# Patient Record
Sex: FEMALE | Race: WHITE | Employment: UNEMPLOYED | URBAN - METROPOLITAN AREA
[De-identification: names, ages, dates, MRNs, and addresses within clinical notes are randomized per-mention and may not be internally consistent; named-entity substitution may affect disease eponyms.]

---

## 2017-08-24 ENCOUNTER — TELEPHONE (OUTPATIENT)
Dept: OBGYN | Facility: CLINIC | Age: 32
End: 2017-08-24

## 2017-08-24 NOTE — TELEPHONE ENCOUNTER
Patient called would like to establish prenatal care .  Patient LMP is 17  Patient is   Patient complains of breast tenderness.

## 2017-08-31 ENCOUNTER — TELEPHONE (OUTPATIENT)
Dept: OBGYN | Facility: CLINIC | Age: 32
End: 2017-08-31

## 2017-08-31 DIAGNOSIS — O20.9 BLEEDING IN EARLY PREGNANCY: Primary | ICD-10-CM

## 2017-08-31 DIAGNOSIS — O20.9 BLEEDING IN EARLY PREGNANCY: ICD-10-CM

## 2017-08-31 LAB — B-HCG SERPL-ACNC: ABNORMAL IU/L (ref 0–5)

## 2017-08-31 PROCEDURE — 86900 BLOOD TYPING SEROLOGIC ABO: CPT | Performed by: ADVANCED PRACTICE MIDWIFE

## 2017-08-31 PROCEDURE — 36415 COLL VENOUS BLD VENIPUNCTURE: CPT | Performed by: ADVANCED PRACTICE MIDWIFE

## 2017-08-31 PROCEDURE — 86901 BLOOD TYPING SEROLOGIC RH(D): CPT | Performed by: ADVANCED PRACTICE MIDWIFE

## 2017-08-31 PROCEDURE — 86850 RBC ANTIBODY SCREEN: CPT | Performed by: ADVANCED PRACTICE MIDWIFE

## 2017-08-31 PROCEDURE — 84702 CHORIONIC GONADOTROPIN TEST: CPT | Performed by: ADVANCED PRACTICE MIDWIFE

## 2017-09-01 ENCOUNTER — TELEPHONE (OUTPATIENT)
Dept: OBGYN | Facility: CLINIC | Age: 32
End: 2017-09-01

## 2017-09-01 ENCOUNTER — HOSPITAL ENCOUNTER (OUTPATIENT)
Dept: ULTRASOUND IMAGING | Facility: CLINIC | Age: 32
Discharge: HOME | End: 2017-09-01
Attending: OBSTETRICS & GYNECOLOGY | Admitting: OBSTETRICS & GYNECOLOGY

## 2017-09-01 DIAGNOSIS — O20.9 BLEEDING IN EARLY PREGNANCY: Primary | ICD-10-CM

## 2017-09-01 DIAGNOSIS — O20.9 BLEEDING IN EARLY PREGNANCY: ICD-10-CM

## 2017-09-01 LAB
ABO + RH BLD: NORMAL
ABO + RH BLD: NORMAL
BLD GP AB SCN SERPL QL: NORMAL
BLOOD BANK CMNT PATIENT-IMP: NORMAL
SPECIMEN EXP DATE BLD: NORMAL

## 2017-09-01 PROCEDURE — 76801 OB US < 14 WKS SINGLE FETUS: CPT

## 2017-09-01 NOTE — TELEPHONE ENCOUNTER
Spoke with patient about US results. Come back for US repeat and intake. Normal us with Subchorionic hemorrhage. Went over bleeding precautions again. Patient agreeable to plan.

## 2017-09-01 NOTE — PROGRESS NOTES
TC to patient to review results. Given pregnancy was result of IUD failure we discussed termination options: medical and surgical.   She says she will think about it.     Gianna Schneider

## 2017-09-01 NOTE — TELEPHONE ENCOUNTER
Spoke with Yazmin and provided her with instructions on how to get to adult imaging for her ultrasound, she is coming from the [employer] where she works. I further explained her HCG results to her and advised we will contact her with US results. She understood plan and had no further questions.

## 2017-09-01 NOTE — TELEPHONE ENCOUNTER
Spoke with Yazmin who is newly pregnant. She states that she realized she was pregnant when she had a Paraguard IUD in. She states that the IUD fell out in the toilet. [facility] and pregnancy was confirmed by UPT. She has upcoming intake/US on 9/18 here at [facility]. She calls last night stating that she has had bright red spotting when she wipes for the past couple days. Denies heavy bleeding. States that she hasn't really even had to use a pad. States she has a little cramping but nothing severe. Nurse ordered betas and ABO per protocol.     Results today show bhcg >18,000. Spoke with Dr. Schneider in clinic. She ordered US.  No availability in clinic. Ordered for radiology and scheduled patient for this afternoon per her request. Left message for patient about US appt. And that we would be in contact about results.

## 2017-09-18 ENCOUNTER — OFFICE VISIT (OUTPATIENT)
Dept: OBGYN | Facility: CLINIC | Age: 32
End: 2017-09-18
Attending: MIDWIFE

## 2017-09-18 VITALS
BODY MASS INDEX: 33.87 KG/M2 | SYSTOLIC BLOOD PRESSURE: 117 MMHG | WEIGHT: 168 LBS | HEART RATE: 88 BPM | DIASTOLIC BLOOD PRESSURE: 80 MMHG | HEIGHT: 59 IN

## 2017-09-18 DIAGNOSIS — O09.41 SUPERVISION OF HIGH-RISK PREGNANCY WITH GRAND MULTIPARITY, FIRST TRIMESTER: Primary | ICD-10-CM

## 2017-09-18 DIAGNOSIS — O99.891 H/O POSTPARTUM DEPRESSION, CURRENTLY PREGNANT: ICD-10-CM

## 2017-09-18 DIAGNOSIS — Z86.59 H/O POSTPARTUM DEPRESSION, CURRENTLY PREGNANT: ICD-10-CM

## 2017-09-18 DIAGNOSIS — Z36.82 ENCOUNTER FOR NUCHAL TRANSLUCENCY TESTING: ICD-10-CM

## 2017-09-18 DIAGNOSIS — G43.009 MIGRAINE WITHOUT AURA AND WITHOUT STATUS MIGRAINOSUS, NOT INTRACTABLE: ICD-10-CM

## 2017-09-18 DIAGNOSIS — Z36.89 ENCOUNTER FOR FETAL ANATOMIC SURVEY: ICD-10-CM

## 2017-09-18 DIAGNOSIS — O21.9 NAUSEA AND VOMITING DURING PREGNANCY PRIOR TO 22 WEEKS GESTATION: ICD-10-CM

## 2017-09-18 DIAGNOSIS — Z34.81 ENCOUNTER FOR SUPERVISION OF OTHER NORMAL PREGNANCY IN FIRST TRIMESTER: Primary | ICD-10-CM

## 2017-09-18 DIAGNOSIS — Z86.19 HISTORY OF COLD SORES: ICD-10-CM

## 2017-09-18 LAB
ABO + RH BLD: NORMAL
ABO + RH BLD: NORMAL
BASOPHILS # BLD AUTO: 0 10E9/L (ref 0–0.2)
BASOPHILS NFR BLD AUTO: 0.1 %
BLD GP AB SCN SERPL QL: NORMAL
BLOOD BANK CMNT PATIENT-IMP: NORMAL
DIFFERENTIAL METHOD BLD: ABNORMAL
EOSINOPHIL # BLD AUTO: 0.1 10E9/L (ref 0–0.7)
EOSINOPHIL NFR BLD AUTO: 0.5 %
ERYTHROCYTE [DISTWIDTH] IN BLOOD BY AUTOMATED COUNT: 12.3 % (ref 10–15)
HCT VFR BLD AUTO: 38.9 % (ref 35–47)
HGB BLD-MCNC: 13 G/DL (ref 11.7–15.7)
IMM GRANULOCYTES # BLD: 0 10E9/L (ref 0–0.4)
IMM GRANULOCYTES NFR BLD: 0.3 %
LYMPHOCYTES # BLD AUTO: 2.3 10E9/L (ref 0.8–5.3)
LYMPHOCYTES NFR BLD AUTO: 16.6 %
MCH RBC QN AUTO: 30.4 PG (ref 26.5–33)
MCHC RBC AUTO-ENTMCNC: 33.4 G/DL (ref 31.5–36.5)
MCV RBC AUTO: 91 FL (ref 78–100)
MONOCYTES # BLD AUTO: 0.8 10E9/L (ref 0–1.3)
MONOCYTES NFR BLD AUTO: 5.6 %
NEUTROPHILS # BLD AUTO: 10.6 10E9/L (ref 1.6–8.3)
NEUTROPHILS NFR BLD AUTO: 76.9 %
NRBC # BLD AUTO: 0 10*3/UL
NRBC BLD AUTO-RTO: 0 /100
PLATELET # BLD AUTO: 420 10E9/L (ref 150–450)
RBC # BLD AUTO: 4.27 10E12/L (ref 3.8–5.2)
SPECIMEN EXP DATE BLD: NORMAL
WBC # BLD AUTO: 13.8 10E9/L (ref 4–11)

## 2017-09-18 PROCEDURE — 86644 CMV ANTIBODY: CPT | Performed by: ADVANCED PRACTICE MIDWIFE

## 2017-09-18 PROCEDURE — 82306 VITAMIN D 25 HYDROXY: CPT | Performed by: ADVANCED PRACTICE MIDWIFE

## 2017-09-18 PROCEDURE — 76817 TRANSVAGINAL US OBSTETRIC: CPT | Mod: ZF

## 2017-09-18 PROCEDURE — 86900 BLOOD TYPING SEROLOGIC ABO: CPT | Performed by: ADVANCED PRACTICE MIDWIFE

## 2017-09-18 PROCEDURE — 86706 HEP B SURFACE ANTIBODY: CPT | Performed by: ADVANCED PRACTICE MIDWIFE

## 2017-09-18 PROCEDURE — 86901 BLOOD TYPING SEROLOGIC RH(D): CPT | Performed by: ADVANCED PRACTICE MIDWIFE

## 2017-09-18 PROCEDURE — 86747 PARVOVIRUS ANTIBODY: CPT | Performed by: ADVANCED PRACTICE MIDWIFE

## 2017-09-18 PROCEDURE — 87086 URINE CULTURE/COLONY COUNT: CPT | Performed by: ADVANCED PRACTICE MIDWIFE

## 2017-09-18 PROCEDURE — 85025 COMPLETE CBC W/AUTO DIFF WBC: CPT | Performed by: ADVANCED PRACTICE MIDWIFE

## 2017-09-18 PROCEDURE — 86780 TREPONEMA PALLIDUM: CPT | Performed by: ADVANCED PRACTICE MIDWIFE

## 2017-09-18 PROCEDURE — 87389 HIV-1 AG W/HIV-1&-2 AB AG IA: CPT | Performed by: ADVANCED PRACTICE MIDWIFE

## 2017-09-18 PROCEDURE — 99212 OFFICE O/P EST SF 10 MIN: CPT | Mod: 25,ZF

## 2017-09-18 PROCEDURE — 86645 CMV ANTIBODY IGM: CPT | Performed by: ADVANCED PRACTICE MIDWIFE

## 2017-09-18 PROCEDURE — 86762 RUBELLA ANTIBODY: CPT | Performed by: ADVANCED PRACTICE MIDWIFE

## 2017-09-18 PROCEDURE — 36415 COLL VENOUS BLD VENIPUNCTURE: CPT | Performed by: ADVANCED PRACTICE MIDWIFE

## 2017-09-18 PROCEDURE — 86850 RBC ANTIBODY SCREEN: CPT | Performed by: ADVANCED PRACTICE MIDWIFE

## 2017-09-18 PROCEDURE — 87340 HEPATITIS B SURFACE AG IA: CPT | Performed by: ADVANCED PRACTICE MIDWIFE

## 2017-09-18 RX ORDER — ONDANSETRON 4 MG/1
4-8 TABLET, ORALLY DISINTEGRATING ORAL
Qty: 30 TABLET | Refills: 3 | Status: SHIPPED | OUTPATIENT
Start: 2017-09-18

## 2017-09-18 RX ORDER — PRENATAL VIT/IRON FUM/FOLIC AC 27MG-0.8MG
1 TABLET ORAL DAILY
COMMUNITY

## 2017-09-18 NOTE — MR AVS SNAPSHOT
After Visit Summary   9/18/2017    Yazmin SCOTT    MRN: [id number]           Patient Information     Date Of Birth          1985        Visit Information        Provider Department      9/18/2017 3:00 PM [facility] ULTRASOUND [facility]        Today's Diagnoses     Encounter for supervision of other normal pregnancy in first trimester    -  1       Follow-ups after your visit        Your next 10 appointments already scheduled     Oct 02, 2017  4:00 PM CDT   NEW OB with SIOMARA Garcia CNM   [facility] ([facility])    [facility] [address]  [address]  [facility] [id number] [phone]              Who to contact     Please call your clinic at [phone] to:    Ask questions about your health    Make or cancel appointments    Discuss your medicines    Learn about your test results    Speak to your doctor   If you have compliments or concerns about an experience at your clinic, or if you wish to file a complaint, please contact [facility] Patient Relations at [phone] or email us at [email or web address]         Additional Information About Your Visit        MyChart Information     [employer] gives you secure access to your electronic health record. If you see a primary care provider, you can also send messages to your care team and make appointments. If you have questions, please call your primary care clinic.  If you do not have a primary care provider, please call [phone] and they will assist you.      [employer] is an electronic gateway that provides easy, online access to your medical records. With [employer], you can request a clinic appointment, read your test results, renew a prescription or communicate with your care team.     To access your existing account, please contact your [facility] or call [phone] for  assistance.        Care EveryWhere ID     This is your Care EveryWhere ID. This could be used by other organizations to access your [facility] medical records  [id number]        Your Vitals Were     Last Period                   (LMP Unknown)            Blood Pressure from Last 3 Encounters:   09/18/17 117/80   12/17/13 115/71   09/23/13 116/70    Weight from Last 3 Encounters:   09/18/17 76.2 kg (168 lb)   12/17/13 68 kg (150 lb)   09/23/13 74 kg (163 lb 3.2 oz)                 Today's Medication Changes          These changes are accurate as of: 9/18/17  5:12 PM.  If you have any questions, ask your nurse or doctor.               Start taking these medicines.        Dose/Directions    ondansetron 4 MG ODT tab   Commonly known as:  ZOFRAN ODT   Used for:  Nausea and vomiting during pregnancy prior to 22 weeks gestation   Started by:  Symone Martinez APRN CNM        Dose:  4-8 mg   Take 1-2 tablets (4-8 mg) by mouth 3 times daily (before meals)   Quantity:  30 tablet   Refills:  3            Where to get your medicines      These medications were sent to [id number] - [place], [place] - [address] AT [facility] [address], [facility] [address]     Phone:  [phone]     ondansetron 4 MG ODT tab                Primary Care Provider Office Phone # Fax #    Zaynab Palmira Garcia PA-C [phone] [address] [facility] [address]        Equal Access to Services     [facility] : Hadii andreas paradao Soomaali, waaxda luqadaha, qaybta kaalmada adelenora, felicitas vieyra . So [facility] [phone].    ATENCIÓN: Si habla español, tiene a weiner disposición servicios gratuitos de asistencia lingüística. Llame al [phone].    We comply with applicable federal civil rights laws and [place] laws. We do not discriminate on the basis of race, color, national origin, age, disability sex, sexual orientation or gender  identity.            Thank you!     Thank you for choosing [facility]  for your care. Our goal is always to provide you with excellent care. Hearing back from our patients is one way we can continue to improve our services. Please take a few minutes to complete the written survey that you may receive in the mail after your visit with us. Thank you!             Your Updated Medication List - Protect others around you: Learn how to safely use, store and throw away your medicines at www.disposemymeds.org.          This list is accurate as of: 9/18/17  5:12 PM.  Always use your most recent med list.                   Brand Name Dispense Instructions for use Diagnosis    ondansetron 4 MG ODT tab    ZOFRAN ODT    30 tablet    Take 1-2 tablets (4-8 mg) by mouth 3 times daily (before meals)    Nausea and vomiting during pregnancy prior to 22 weeks gestation       prenatal multivitamin plus iron 27-0.8 MG Tabs per tablet      Take 1 tablet by mouth daily        ZANTAC 150 MG tablet   Generic drug:  ranitidine      Take 1 tablet by mouth daily.

## 2017-09-18 NOTE — LETTER
"2017       RE: Yazmin SCOTT  [address]  [place] [address]     Dear Colleague,    Thank you for referring your patient, Yazmin CSOTT, to the [facility] at [facility]. Please see a copy of my visit note below.      Subjective   31 year old female who presents to clinic for initiation of OB care.   at 9w1d with estimated date of delivery of 2018 based on US confirms.  Pregnancy is unplanned and is still not sure she wants to keep pregnancy - is unsure if she could handle the \"guilt of ending pregnancy\".   FOB is not her estranged  - it is a friend who also is  whose wife just had a child in August.  Had Paragard that wasn't in place and that's when she found pregnancy. Wishes she could plan adoption as that would be the best way but doesn't feel she could explain that to her children.   Is not willing to discuss options today but will follow up asap if decides to end pregnancy.  Desires BTL postpartum.     Symptoms since LMP include nausea, vomiting and fatigue.  Some nausea, woth +vomting.    Vomiting at night.  Vomits in AM if doesn't eat right away. Patient has tried these relief measures: diet modification, small frequent meals, increased rest and increased fluids.    Co-morbids - Migraines (takes Amitriptyline prn), Had Umbilical hernia repair ,   History of oral cold sores - on daily Acyclovir. Had blood screen that told her she had herpes but she has never had genital outbreaks.  Was on Wellbutrin previously, stopped when found out pregnant - feels like it would be nice to restart.  Denies SI or HI.     Medications - Acyclovir daily, Amitriptyline stopped when found out pregnant.  Reviewed dating ultrasound no longer showing hemorrhage, +FHTs, good growth.   Genetic/Infection questionnaire completed, risks include None.           PERSONAL/SOCIAL HISTORY  New partner aware - involved at some " level. He's also  - his wife was pregnant and she just delivered last August, hasn't seen kid.   Pt moved from [place] recently.     Lives with their family - her kids and her ex mother in law.    She is  from her  for the past four years, he lives in [place] again.     Employment: Full time.  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  Additional items: Has been on WIC, [facility]   Denies past or present domestic violence, sexual and psychological abuse.    Objective  -VS: reviewed and within normal limits   -General appearance: no acute distress, patient is comfortable   NEUROLOGICAL/PSYCHIATRIC   - Orientated x3,   -Mood and affect: : normal           Assessment/Plan   at 9w1d  by No LMP recorded (lmp unknown). Patient is pregnant. and US confirmation  Encounter Diagnoses   Name Primary?     Supervision of high-risk pregnancy with grand multiparity, first trimester Yes     Nausea and vomiting during pregnancy prior to 22 weeks gestation      Encounter for nuchal translucency testing      Encounter for fetal anatomic survey      Migraine without aura and without status migrainosus, not intractable      H/O postpartum depression, currently pregnant      History of cold sores      Orders Placed This Encounter   Procedures     25- OH-Vitamin D     CBC with Platelets Differential [ENY241]     Anti Treponema [WHB2366]     Rubella Antibody IgG Quantitative [AQT2555]     Hepatitis B Surface Antigen [MED661]     HIV Antigen Antibody Combo [LBK5490]     CMV Antibody IgG     CMV antibody IgM     Parvovirus B19 antibodies IgG IgM     Hepatitis B Surface Antibody     [facility] REFERRAL-PREGNANCY     ABO/Rh Type and Screen [LSJ357]     Orders Placed This Encounter   Medications     Prenatal Vit-Fe Fumarate-FA (PRENATAL MULTIVITAMIN PLUS IRON) 27-0.8 MG TABS per tablet     Sig: Take 1 tablet by mouth daily     ondansetron (ZOFRAN ODT) 4 MG ODT  tab     Sig: Take 1-2 tablets (4-8 mg) by mouth 3 times daily (before meals)     Dispense:  30 tablet     Refill:  3         - Oriented to Practice, types of care, and how to reach a provider.  Pt prefers [facility] care.   - Patient received 1st trimester new OB education packet complete with aide of The Expectant Family booklet including information on genetic screening test options.  - Patient desires 1st trimester screening which was ordered.  - Patient was encouraged to start prenatal vitamins as tolerated.    - Patient was sent to lab for routine OB labs including as above.    - Reviewed category B Acyclovir - can continue daily suppression for oral HSV if desires. Reviewed may have first genital outbreak in pregnancy - if does needs SSE on admit to labor. Will continue to discuss.   - Reviewed category C Amitriptyline - Sarabjit referral to discuss options  - Reviewed category C wellbutrin - pt states stable without at present but unsure if she wants to restart anything. Open to Psych intake prn.  Declines talk therapy   - Patient instructed to schedule new OB exam with provider in 2-3 weeks.  - Pregnancy concerns to be addressed by provider at new OB exam include: options counseling/confirm desires moving forward with pregnancy, migraines, oral HSV, depression/mood.    Pt to RTO for NOB visit in 2-3 weeks and prn if questions or concerns    SIOMARA Sheppard

## 2017-09-18 NOTE — PATIENT INSTRUCTIONS
"  Thank you for choosing [facility] for your obstetrical care.  We are an integrated health clinic with obstetricians, midwives, a psychologist, an acupuncturist, a nutritionist, a pharmacist, internal medicine and family practice all in one.  If you have questions about services offered please ask.      o Please keep all appointments with your provider.  You will help catch, prevent and treat problems early.  o Review your Expectant Family booklet and folder given to you at this intake visit.  It can answer many basic questions including:    Treatment for nausea and vomiting    Medications that are safe in pregnancy    Healthy diet and weight gain    Exercise and activity  o ASK questions!!  Please use \"Questions for my New OB visit\" form to write down any questions or concerns for your next visit.  o Eat a healthy diet.  Visit www.choosemyplate.gov and click on  Pregnancy and Breastfeeding  for information and tips  o Do not smoke.  Avoid other people's smoke, too.  We are happy to help with referrals to stop smoking programs.  o Do not drink alcohol.  o Try to avoid people who have colds or other infections.  Practice good hand washing.  o Consider registering for our Healthy Pregnancy Class here at [facility].  This class is offered every 3rd Wednesday from 2:30-4:30 p.m.  [place] at [phone] or online at [email or web address] or [email or web address]  o Consider registering for prenatal education classes through [place] [facility].  You can view class schedules and register online at [email or web address] or call [phone] for questions     For urgent concerns, call [facility] at [phone] to speak with a triage nurse during regular clinic hours (8:00 am - 4:30 pm).  This line is answered by our service 24 hours a day, after hours a provider will return your call.  "

## 2017-09-18 NOTE — PROGRESS NOTES
31 year old female, , with unsure LMP. 8 5/7 weeks by 6 week ultrasound. Estimated Date of Delivery: 2018, presents for confirmation of dates and assessment of viability. This study was done transvaginally.    Measurements     CRL = 23.8 mm = 9 1/7 weeks  EGA.       Fetal anatomy appears normal for gestational age.     Fetal/Fetal Cardiac Activity: Present.  FHR = 176bpm.     Implantation: Intrauterine.     Cervix = 4.6 cm      Maternal structures appear normal.    Impression: IUP at 8+5 wks     Recommend comprehensive scan at 18 to 20 weeks.    MITZI Cuellar MD, FACOG  [facility]  OB/GYN    2017  4:44 PM

## 2017-09-18 NOTE — MR AVS SNAPSHOT
"              After Visit Summary   9/18/2017    Yazmin SCOTT    MRN: [id number]           Patient Information     Date Of Birth          1985        Visit Information        Provider Department      9/18/2017 3:30 PM Symone Martinez APRN CNM [facility]        Today's Diagnoses     Supervision of high-risk pregnancy with grand multiparity, first trimester    -  1    Nausea and vomiting during pregnancy prior to 22 weeks gestation        Encounter for nuchal translucency testing        Encounter for fetal anatomic survey        Migraine without aura and without status migrainosus, not intractable        H/O postpartum depression, currently pregnant        History of cold sores          Care Instructions      Thank you for choosing [facility] for your obstetrical care.  We are an integrated health clinic with obstetricians, midwives, a psychologist, an acupuncturist, a nutritionist, a pharmacist, internal medicine and family practice all in one.  If you have questions about services offered please ask.      o Please keep all appointments with your provider.  You will help catch, prevent and treat problems early.  o Review your Expectant Family booklet and folder given to you at this intake visit.  It can answer many basic questions including:    Treatment for nausea and vomiting    Medications that are safe in pregnancy    Healthy diet and weight gain    Exercise and activity  o ASK questions!!  Please use \"Questions for my New OB visit\" form to write down any questions or concerns for your next visit.  o Eat a healthy diet.  Visit www.choosemyplate.gov and click on  Pregnancy and Breastfeeding  for information and tips  o Do not smoke.  Avoid other people's smoke, too.  We are happy to help with referrals to stop smoking programs.  o Do not drink alcohol.  o Try to avoid people who have colds or other infections.  Practice good hand washing.  o Consider " registering for our Healthy Pregnancy Class here at [facility].  This class is offered every 3rd Wednesday from 2:30-4:30 p.m.  [place] at [phone] or online at [email or web address] or [email or web address]  o Consider registering for prenatal education classes through [facility] at [facility].  You can view class schedules and register online at [email or web address].[email or web address] or call [phone] for questions     For urgent concerns, call [facility] at [phone] to speak with a triage nurse during regular clinic hours (8:00 am - 4:30 pm).  This line is answered by our service 24 hours a day, after hours a provider will return your call.          Follow-ups after your visit        Additional Services     MAT FETAL MED CTR REFERRAL-PREGNANCY       >> Patient may proceed with recommendations for further testing as directed by the Maternal Fetal Medicine Specialist >>    >> If requesting Fetal Echo: MFM will determine appropriate location for exam due to indication.    >> If requesting Lung Maturity Amnio:  If results indicate fetal lung maturity, induction or C/S is recommended within 36 hours.  Please schedule accordingly.     Dear Patient:   Please be aware that coverage of these services is subject to the terms and limitations of your health insurance plan.  Call member services at your health plan with any benefit or coverage questions.      Please bring the following to your appointment:    >>  Any x-rays, CTs or MRIs which have been performed.  Contact the facility where they were done to arrange for  prior to your scheduled appointment.  Any new CT, MRI or other procedures ordered by your specialist must be performed at a [facility] facility or coordinated by your clinic's referral office.  >>  List of current medications   >>  This referral request   >>  Any documents/labs given to you for this referral                  Follow-up notes from your care team     Will only  call if abnormal Return in 2 weeks (on 10/2/2017) for NOB exam.      Your next 10 appointments already scheduled     Oct 02, 2017  4:00 PM CDT   NEW OB with SIOMARA Garcia CNM   [facility] ([facility])    [facility] [address]  [address]  [facility] [address] [phone]              Future tests that were ordered for you today     Open Future Orders        Priority Expected Expires Ordered    [facility] Genetic Counseling Routine  9/20/2018 9/19/2017    [facility] Nuchal Transluc w US Single Routine  7/19/2018 9/19/2017    [facility] US Comprehensive Single Routine  7/19/2018 9/19/2017            Who to contact     Please call your clinic at [phone] to:    Ask questions about your health    Make or cancel appointments    Discuss your medicines    Learn about your test results    Speak to your doctor   If you have compliments or concerns about an experience at your clinic, or if you wish to file a complaint, please contact [facility] Patient Relations at [phone] or email us at [email or web address]         Additional Information About Your Visit        [employer] Information     [employer] gives you secure access to your electronic health record. If you see a primary care provider, you can also send messages to your care team and make appointments. If you have questions, please call your primary care clinic.  If you do not have a primary care provider, please call [phone] and they will assist you.      [employer] is an electronic gateway that provides easy, online access to your medical records. With [employer], you can request a clinic appointment, read your test results, renew a prescription or communicate with your care team.     To access your existing account, please contact your [facility] or call [phone] for assistance.        Care EveryWhere ID     This is your Care  "EveryWhere ID. This could be used by other organizations to access your [place] medical records  [id number]        Your Vitals Were     Pulse Height Last Period BMI (Body Mass Index)          88 1.499 m (4' 11\") (LMP Unknown) 33.93 kg/m2         Blood Pressure from Last 3 Encounters:   09/18/17 117/80   12/17/13 115/71   09/23/13 116/70    Weight from Last 3 Encounters:   09/18/17 76.2 kg (168 lb)   12/17/13 68 kg (150 lb)   09/23/13 74 kg (163 lb 3.2 oz)              We Performed the Following     25- OH-Vitamin D     ABO/Rh Type and Screen [HJF742]     Anti Treponema [IOF8130]     CBC with Platelets Differential [AVQ765]     CMV Antibody IgG     CMV antibody IgM     Hepatitis B Surface Antibody     Hepatitis B Surface Antigen [REK471]     HIV Antigen Antibody Combo [JIA0653]     MAT FETAL MED CTR REFERRAL-PREGNANCY     Parvovirus B19 antibodies IgG IgM     Rubella Antibody IgG Quantitative [TCL0398]     Urine Culture Aerobic Bacterial          Today's Medication Changes          These changes are accurate as of: 9/18/17 11:59 PM.  If you have any questions, ask your nurse or doctor.               Start taking these medicines.        Dose/Directions    ondansetron 4 MG ODT tab   Commonly known as:  ZOFRAN ODT   Used for:  Nausea and vomiting during pregnancy prior to 22 weeks gestation   Started by:  Syomne Martinez APRN CNM        Dose:  4-8 mg   Take 1-2 tablets (4-8 mg) by mouth 3 times daily (before meals)   Quantity:  30 tablet   Refills:  3            Where to get your medicines      These medications were sent to [id number] - [place], [place] - [address] AT [facility] [address], [facility] [address]     Phone:  [phone]     ondansetron 4 MG ODT tab                Primary Care Provider Office Phone # Fax #    Zaynab Garcia PA-C [phone] [address] [facility] [address]        Equal Access to Services     " KARLA KIRAN : Hadii aad ku saroj Devlin, wanikosda luqadaha, qaybta kaalkevin sandralenora, waxanusha bia hayiron zavaletaflorecitajacqui vieyra . So [facility] [phone].    ATENCIÓN: Si habla español, tiene a weiner disposición servicios gratuitos de asistencia lingüística. Llame al [phone].    We comply with applicable federal civil rights laws and [place] laws. We do not discriminate on the basis of race, color, national origin, age, disability sex, sexual orientation or gender identity.            Thank you!     Thank you for choosing [facility]  for your care. Our goal is always to provide you with excellent care. Hearing back from our patients is one way we can continue to improve our services. Please take a few minutes to complete the written survey that you may receive in the mail after your visit with us. Thank you!             Your Updated Medication List - Protect others around you: Learn how to safely use, store and throw away your medicines at www.disposemymeds.org.          This list is accurate as of: 9/18/17 11:59 PM.  Always use your most recent med list.                   Brand Name Dispense Instructions for use Diagnosis    ondansetron 4 MG ODT tab    ZOFRAN ODT    30 tablet    Take 1-2 tablets (4-8 mg) by mouth 3 times daily (before meals)    Nausea and vomiting during pregnancy prior to 22 weeks gestation       prenatal multivitamin plus iron 27-0.8 MG Tabs per tablet      Take 1 tablet by mouth daily        ZANTAC 150 MG tablet   Generic drug:  ranitidine      Take 1 tablet by mouth daily.

## 2017-09-18 NOTE — LETTER
2017       RE: Yazmin SCOTT  [address]  [place] [facility] [address]     Dear Colleague,    Thank you for referring your patient, Yazmin SCOTT, to the [facility] at [facility]. Please see a copy of my visit note below.    31 year old female, , with unsure LMP. 8 5/7 weeks by 6 week ultrasound. Estimated Date of Delivery: 2018, presents for confirmation of dates and assessment of viability. This study was done transvaginally.    Measurements     CRL = 23.8 mm = 9 1/7 weeks  EGA.       Fetal anatomy appears normal for gestational age.     Fetal/Fetal Cardiac Activity: Present.  FHR = 176bpm.     Implantation: Intrauterine.     Cervix = 4.6 cm      Maternal structures appear normal.    Impression: IUP at 8+5 wks     Recommend comprehensive scan at 18 to 20 weeks.    MITZI Cuellar MD, FACOG  [facility]  OB/GYN    2017  4:44 PM          Again, thank you for allowing me to participate in the care of your patient.      Sincerely,    [facility]

## 2017-09-18 NOTE — PROGRESS NOTES
"  Subjective   31 year old female who presents to clinic for initiation of OB care.   at 9w1d with estimated date of delivery of 2018 based on US confirms.  Pregnancy is unplanned and is still not sure she wants to keep pregnancy - is unsure if she could handle the \"guilt of ending pregnancy\".   FOB is not her estranged  - it is a friend who also is  whose wife just had a child in August.  Had Paragard that wasn't in place and that's when she found pregnancy. Wishes she could plan adoption as that would be the best way but doesn't feel she could explain that to her children.   Is not willing to discuss options today but will follow up asap if decides to end pregnancy.  Desires BTL postpartum.     Symptoms since LMP include nausea, vomiting and fatigue.  Some nausea, woth +vomting.    Vomiting at night.  Vomits in AM if doesn't eat right away. Patient has tried these relief measures: diet modification, small frequent meals, increased rest and increased fluids.    Co-morbids - Migraines (takes Amitriptyline prn), Had Umbilical hernia repair ,   History of oral cold sores - on daily Acyclovir. Had blood screen that told her she had herpes but she has never had genital outbreaks.  Was on Wellbutrin previously, stopped when found out pregnant - feels like it would be nice to restart.  Denies SI or HI.     Medications - Acyclovir daily, Amitriptyline stopped when found out pregnant.  Reviewed dating ultrasound no longer showing hemorrhage, +FHTs, good growth.   Genetic/Infection questionnaire completed, risks include None.           PERSONAL/SOCIAL HISTORY  New partner aware - involved at some level. He's also  - his wife was pregnant and she just delivered last August, hasn't seen kid.   Pt moved from [place] recently.     Lives with their family - her kids and her ex mother in law.    She is  from her  for the past four years, he lives in [place] again. "     Employment: Full time.  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  Additional items: Has been on WIC, [facility]   Denies past or present domestic violence, sexual and psychological abuse.    Objective  -VS: reviewed and within normal limits   -General appearance: no acute distress, patient is comfortable   NEUROLOGICAL/PSYCHIATRIC   - Orientated x3,   -Mood and affect: : normal           Assessment/Plan   at 9w1d  by No LMP recorded (lmp unknown). Patient is pregnant. and US confirmation  Encounter Diagnoses   Name Primary?     Supervision of high-risk pregnancy with grand multiparity, first trimester Yes     Nausea and vomiting during pregnancy prior to 22 weeks gestation      Encounter for nuchal translucency testing      Encounter for fetal anatomic survey      Migraine without aura and without status migrainosus, not intractable      H/O postpartum depression, currently pregnant      History of cold sores      Orders Placed This Encounter   Procedures     25- OH-Vitamin D     CBC with Platelets Differential [HYT300]     Anti Treponema [YXE6856]     Rubella Antibody IgG Quantitative [FOC1377]     Hepatitis B Surface Antigen [CVL270]     HIV Antigen Antibody Combo [PUG0671]     CMV Antibody IgG     CMV antibody IgM     Parvovirus B19 antibodies IgG IgM     Hepatitis B Surface Antibody     MAT FETAL MED CTR REFERRAL-PREGNANCY     ABO/Rh Type and Screen [JYW359]     Orders Placed This Encounter   Medications     Prenatal Vit-Fe Fumarate-FA (PRENATAL MULTIVITAMIN PLUS IRON) 27-0.8 MG TABS per tablet     Sig: Take 1 tablet by mouth daily     ondansetron (ZOFRAN ODT) 4 MG ODT tab     Sig: Take 1-2 tablets (4-8 mg) by mouth 3 times daily (before meals)     Dispense:  30 tablet     Refill:  3         - Oriented to Practice, types of care, and how to reach a provider.  Pt prefers CNM care.   - Patient received 1st trimester new OB education packet complete with  aide of The Expectant Family booklet including information on genetic screening test options.  - Patient desires 1st trimester screening which was ordered.  - Patient was encouraged to start prenatal vitamins as tolerated.    - Patient was sent to lab for routine OB labs including as above.    - Reviewed category B Acyclovir - can continue daily suppression for oral HSV if desires. Reviewed may have first genital outbreak in pregnancy - if does needs SSE on admit to labor. Will continue to discuss.   - Reviewed category C Amitriptyline - Sarabjit referral to discuss options  - Reviewed category C wellbutrin - pt states stable without at present but unsure if she wants to restart anything. Open to Psych intake prn.  Declines talk therapy   - Patient instructed to schedule new OB exam with provider in 2-3 weeks.  - Pregnancy concerns to be addressed by provider at new OB exam include: options counseling/confirm desires moving forward with pregnancy, migraines, oral HSV, depression/mood.    Pt to RTO for NOB visit in 2-3 weeks and prn if questions or concerns    SIOMARA Sheppard CNM

## 2017-09-19 ENCOUNTER — TELEPHONE (OUTPATIENT)
Dept: OBGYN | Facility: CLINIC | Age: 32
End: 2017-09-19

## 2017-09-19 DIAGNOSIS — O99.341 DEPRESSION AFFECTING PREGNANCY IN FIRST TRIMESTER, ANTEPARTUM: Primary | ICD-10-CM

## 2017-09-19 DIAGNOSIS — F32.A DEPRESSION AFFECTING PREGNANCY IN FIRST TRIMESTER, ANTEPARTUM: Primary | ICD-10-CM

## 2017-09-19 PROBLEM — G43.009 MIGRAINE WITHOUT AURA AND WITHOUT STATUS MIGRAINOSUS, NOT INTRACTABLE: Status: ACTIVE | Noted: 2017-09-19

## 2017-09-19 PROBLEM — Z86.19 HISTORY OF COLD SORES: Status: ACTIVE | Noted: 2017-09-19

## 2017-09-19 PROBLEM — O99.891 H/O POSTPARTUM DEPRESSION, CURRENTLY PREGNANT: Status: ACTIVE | Noted: 2017-09-19

## 2017-09-19 PROBLEM — O09.41: Status: ACTIVE | Noted: 2017-09-19

## 2017-09-19 PROBLEM — O21.9 NAUSEA AND VOMITING DURING PREGNANCY PRIOR TO 22 WEEKS GESTATION: Status: ACTIVE | Noted: 2017-09-19

## 2017-09-19 PROBLEM — Z86.59 H/O POSTPARTUM DEPRESSION, CURRENTLY PREGNANT: Status: ACTIVE | Noted: 2017-09-19

## 2017-09-19 PROBLEM — D72.829 ELEVATED WBC COUNT: Status: ACTIVE | Noted: 2017-09-19

## 2017-09-19 PROBLEM — O09.40 ENCOUNTER FOR SUPERVISION OF HIGH RISK PREGNANCY WITH GRAND MULTIPARITY, ANTEPARTUM: Status: ACTIVE | Noted: 2017-09-19

## 2017-09-19 PROBLEM — Z78.9 NONIMMUNE TO HEPATITIS B VIRUS: Status: ACTIVE | Noted: 2017-09-19

## 2017-09-19 LAB
BACTERIA SPEC CULT: NORMAL
CMV IGG SERPL QL IA: >8 AI (ref 0–0.8)
CMV IGM SERPL QL IA: <0.2 AI (ref 0–0.8)
DEPRECATED CALCIDIOL+CALCIFEROL SERPL-MC: 26 UG/L (ref 20–75)
HBV SURFACE AB SERPL IA-ACNC: 4.93 M[IU]/ML
HBV SURFACE AG SERPL QL IA: NONREACTIVE
HIV 1+2 AB+HIV1 P24 AG SERPL QL IA: NONREACTIVE
Lab: NORMAL
RUBV IGG SERPL IA-ACNC: 16 IU/ML
SPECIMEN SOURCE: NORMAL
T PALLIDUM IGG+IGM SER QL: NEGATIVE

## 2017-09-19 RX ORDER — SERTRALINE HYDROCHLORIDE 25 MG/1
25 TABLET, FILM COATED ORAL DAILY
Qty: 30 TABLET | Refills: 3 | Status: SHIPPED | OUTPATIENT
Start: 2017-09-19

## 2017-09-19 NOTE — TELEPHONE ENCOUNTER
31 year old  at 9w2d - was seen at OB intake and wanted to discuss more options for depression medication as was on Wellbutrin preivously but stopped with +UPT.  Consutled Rosio PhD who agrees that Zoloft has better known safety profile and can be regarded as first line although may need to be changed if not effective.  Pt agreeable and RX sent for 25mg Zoloft daily. Will titrate dose prn.  Symone STRINGER, CNM

## 2017-09-20 LAB
B19V IGG SER IA-ACNC: 1.39 IV
B19V IGM SER IA-ACNC: 0.15 IV

## 2017-10-06 ENCOUNTER — TELEPHONE (OUTPATIENT)
Dept: MATERNAL FETAL MEDICINE | Facility: CLINIC | Age: 32
End: 2017-10-06

## 2017-10-06 NOTE — TELEPHONE ENCOUNTER
MFM received a referral from [facility] for FTS and L2.  Patient called to cancel her appointments as she states she is moving.  Referring clinic noitfied, removing orders.    Tram Elizalde

## 2017-11-12 ENCOUNTER — HEALTH MAINTENANCE LETTER (OUTPATIENT)
Age: 32
End: 2017-11-12

## 2020-03-01 ENCOUNTER — HEALTH MAINTENANCE LETTER (OUTPATIENT)
Age: 35
End: 2020-03-01

## 2020-12-14 ENCOUNTER — HEALTH MAINTENANCE LETTER (OUTPATIENT)
Age: 35
End: 2020-12-14

## 2021-04-18 ENCOUNTER — HEALTH MAINTENANCE LETTER (OUTPATIENT)
Age: 36
End: 2021-04-18

## 2021-10-02 ENCOUNTER — HEALTH MAINTENANCE LETTER (OUTPATIENT)
Age: 36
End: 2021-10-02

## 2022-05-14 ENCOUNTER — HEALTH MAINTENANCE LETTER (OUTPATIENT)
Age: 37
End: 2022-05-14

## 2022-09-03 ENCOUNTER — HEALTH MAINTENANCE LETTER (OUTPATIENT)
Age: 37
End: 2022-09-03

## 2023-06-03 ENCOUNTER — HEALTH MAINTENANCE LETTER (OUTPATIENT)
Age: 38
End: 2023-06-03

## 2024-02-17 ENCOUNTER — HEALTH MAINTENANCE LETTER (OUTPATIENT)
Age: 39
End: 2024-02-17